# Patient Record
Sex: FEMALE | Race: WHITE | NOT HISPANIC OR LATINO | Employment: OTHER | URBAN - METROPOLITAN AREA
[De-identification: names, ages, dates, MRNs, and addresses within clinical notes are randomized per-mention and may not be internally consistent; named-entity substitution may affect disease eponyms.]

---

## 2017-01-31 ENCOUNTER — ALLSCRIPTS OFFICE VISIT (OUTPATIENT)
Dept: OTHER | Facility: OTHER | Age: 82
End: 2017-01-31

## 2017-05-05 ENCOUNTER — GENERIC CONVERSION - ENCOUNTER (OUTPATIENT)
Dept: OTHER | Facility: OTHER | Age: 82
End: 2017-05-05

## 2017-05-12 ENCOUNTER — ALLSCRIPTS OFFICE VISIT (OUTPATIENT)
Dept: OTHER | Facility: OTHER | Age: 82
End: 2017-05-12

## 2017-07-17 ENCOUNTER — GENERIC CONVERSION - ENCOUNTER (OUTPATIENT)
Dept: OTHER | Facility: OTHER | Age: 82
End: 2017-07-17

## 2017-07-28 ENCOUNTER — GENERIC CONVERSION - ENCOUNTER (OUTPATIENT)
Dept: OTHER | Facility: OTHER | Age: 82
End: 2017-07-28

## 2017-08-18 ENCOUNTER — GENERIC CONVERSION - ENCOUNTER (OUTPATIENT)
Dept: OTHER | Facility: OTHER | Age: 82
End: 2017-08-18

## 2017-08-22 ENCOUNTER — GENERIC CONVERSION - ENCOUNTER (OUTPATIENT)
Dept: OTHER | Facility: OTHER | Age: 82
End: 2017-08-22

## 2017-10-02 ENCOUNTER — GENERIC CONVERSION - ENCOUNTER (OUTPATIENT)
Dept: OTHER | Facility: OTHER | Age: 82
End: 2017-10-02

## 2017-11-06 ENCOUNTER — GENERIC CONVERSION - ENCOUNTER (OUTPATIENT)
Dept: OTHER | Facility: OTHER | Age: 82
End: 2017-11-06

## 2017-11-13 ENCOUNTER — GENERIC CONVERSION - ENCOUNTER (OUTPATIENT)
Dept: FAMILY MEDICINE CLINIC | Facility: CLINIC | Age: 82
End: 2017-11-13

## 2017-11-14 ENCOUNTER — GENERIC CONVERSION - ENCOUNTER (OUTPATIENT)
Dept: OTHER | Facility: OTHER | Age: 82
End: 2017-11-14

## 2018-01-03 ENCOUNTER — GENERIC CONVERSION - ENCOUNTER (OUTPATIENT)
Dept: FAMILY MEDICINE CLINIC | Facility: CLINIC | Age: 83
End: 2018-01-03

## 2018-01-12 VITALS
TEMPERATURE: 97.8 F | WEIGHT: 106.5 LBS | OXYGEN SATURATION: 97 % | HEART RATE: 139 BPM | HEIGHT: 62 IN | SYSTOLIC BLOOD PRESSURE: 122 MMHG | DIASTOLIC BLOOD PRESSURE: 70 MMHG | BODY MASS INDEX: 19.6 KG/M2 | RESPIRATION RATE: 16 BRPM

## 2018-01-13 VITALS
RESPIRATION RATE: 16 BRPM | HEIGHT: 62 IN | WEIGHT: 112.38 LBS | HEART RATE: 58 BPM | SYSTOLIC BLOOD PRESSURE: 136 MMHG | TEMPERATURE: 97.4 F | BODY MASS INDEX: 20.68 KG/M2 | DIASTOLIC BLOOD PRESSURE: 82 MMHG | OXYGEN SATURATION: 96 %

## 2018-01-24 NOTE — PROGRESS NOTES
Assessment   1  Encounter for preventive health examination (V70 0) (Z00 00)  2  Cancer of female breast (174 9) (C50 919)  3  Malignant neoplasm of colon (153 9) (C18 9)  4  Hypertension (401 9) (I10)  5  Atrial fibrillation (427 31) (I48 91)  6  Anxiety (300 00) (F41 9)  7  Pacemaker (V45 01) (Z95 0)  8  Tachycardia (785 0) (R00 0)  9  History of Cataract Surgery  10  Urinary incontinence (788 30) (R32)1      1 Amended By: Nadiya Leon; Jan 31 2017 12:25 PM EST    Plan  Anxiety    · Renew: ALPRAZolam 0 25 MG Oral Tablet; TAKE 1 TABLET 3 TIMES DAILY AS NEEDED  Need for pneumococcal vaccination    · Administered: Prevnar 13 Intramuscular Suspension  Tachycardia    · *VB - Urinary Incontinence Screen (Dx Z13 89 Screen for UI); Status:Complete;1    Done: 32BVK4385 12:00AM  Urinary incontinence    · Use an absorbent pad or garment to help with your urine accidents ; Status:Complete;    Done: 93IPU9030 12:26PM1      1 Amended By: Nadiya Leon; Jan 31 2017 12:24 PM EST    Discussion/Summary  Impression: Subsequent Annual Wellness Visit  Cardiovascular screening and counseling: the risks and benefits of screening were discussed and screening is current  Diabetes screening and counseling: the risks and benefits of screening were discussed and screening is current  Colorectal cancer screening and counseling: the risks and benefits of screening were discussed and the patient declines screening  Breast cancer screening and counseling: the risks and benefits of screening were discussed and the patient declines screening  Osteoporosis screening and counseling: the risks and benefits of screening were discussed and the patient declines screening  Glaucoma screening and counseling: the risks and benefits of screening were discussed and screening is current   Immunizations: the risks and benefits of influenza vaccination were discussed with the patient, influenza vaccine is up to date this year, the risks and benefits of pneumococcal vaccination were discussed with the patient and the lifetime pneumococcal vaccine has been completed  Advance Directive Planning: complete and up to date  Patient Discussion: plan discussed with the patient, plan discussed with the patient's family, follow-up visit needed in one year  Chief Complaint  patient presented here for annual well visit      History of Present Illness  Welcome to Medicare and Wellness Visits: The patient is being seen for the subsequent annual wellness visit  Medicare Screening and Risk Factors   Hospitalizations: she has been previously hospitalizied  Once per lifetime medicare screening tests: ECG  Medicare Screening Tests Risk Questions   Osteoporosis risk assessment: , female gender and over 48years of age  HIV risk assessment: +prbc's, but none indicated  Drug and Alcohol Use: The patient is a former cigarette smoker  The patient reports never drinking alcohol  She has never used illicit drugs  Diet and Physical Activity: Current diet includes well balanced meals  The patient does not exercise  Mood Disorder and Cognitive Impairment Screening: She denies feeling down, depressed, or hopeless over the past two weeks  She denies feeling little interest or pleasure in doing things over the past two weeks  Cognitive impairment screening: denies difficulty learning/retaining new information, denies difficulty handling complex tasks, denies difficulty with reasoning, denies difficulty with spatial ability and orientation, denies difficulty with language and denies difficulty with behavior  Functional Ability/Level of Safety: Hearing is slightly decreased and a hearing aid is not used   The patient is currently able to do activities of daily living without limitations, able to do instrumental activities of daily living without limitations, able to participate in social activities without limitations and able to drive without limitations  Activities of daily living details: transportation help needed, but does not need help using the phone, does not need help shopping, no meal preparation help needed, does not need help doing housework, does not need help doing laundry and does not need help managing money  Fall risk factors:  visual impairment and urinary incontinence, but no cognitive impairment and no previous fall  Home safety risk factors:  no grab bars in the bathroom and does not have grab bars in bathroom  Advance Directives: Advance directives: living will and dnr/dni with code  accept tx up, but no advance directives  end of life decisions were reviewed with the patient and I agree with the patient's decisions  Co-Managers and Medical Equipment/Suppliers: See Patient Care Team      Review of Systems    Constitutional: negative  ENT: negative  Cardiovascular: negative  Respiratory: negative  Active Problems   1  Anxiety (300 00) (F41 9)  2  Atrial fibrillation (427 31) (I48 91)  3  Breast cancer (174 9) (C50 919)  4  Cancer of female breast (174 9) (C50 919)  5  Hypertension (401 9) (I10)  6  Malignant neoplasm of colon (153 9) (C18 9)  7  Metastasis to liver (197 7) (C78 7)  8  Osteoporosis (733 00) (M81 0)  9  Pacemaker (V45 01) (Z95 0)  10  Tachycardia (785 0) (R00 0)    Past Medical History    · History of Arm swelling (729 81) (M79 89)   · History of Dysuria (788 1) (R30 0)   · History of H/O: hysterectomy (V88 01) (Z90 710)   · History of Hip pain (719 45) (M25 559)   · History of gastric ulcer (V12 79) (Z87 19)   · History of sick sinus syndrome (V12 59) (Z86 79)   · History of vertigo (V12 49) (Z87 898)   · History of Leg pain (729 5) (M79 606)   · History of Mass of left upper extremity (782 2) (R22 32)   · History of Palpitations (785 1) (R00 2)   · History of Sinus Tachycardia (427 89)    The active problems and past medical history were reviewed and updated today        Surgical History    · History of Breast Surgery Mastectomy   · History of Cataract Surgery   · History of Gallbladder Surgery   · History of Intra-arterial Infusion Pump   · History of Pacemaker Permanent Placement   · History of Radical Total Abdominal Hysterectomy   · History of Wedge Liver Biopsy    The surgical history was reviewed and updated today  Family History  Mother    · Family history of Breast Cancer (V16 3)   · Family history of Mother  At Age 80  Father    · Family history of Father  At Age [de-identified]   · Family history of Heart Disease (V17 49)   · Family history of Hypertension (V17 49)    The family history was reviewed and updated today  Social History    · Being A Social Drinker   · Former smoker (C24 63) (A03 039)  The social history was reviewed and updated today  The social history was reviewed and is unchanged  Current Meds  1  ALPRAZolam 0 25 MG Oral Tablet; TAKE 1 TABLET 3 TIMES DAILY AS NEEDED; Therapy: 59XUP4629 to (Joanna Richter)  Requested for: 51DDJ1777; Last   Rx:2016 Ordered  2  AmLODIPine Besylate 10 MG Oral Tablet; TAKE 1 TABLET DAILY; Therapy: 68HLY6511 to (Evaluate:2017)  Requested for: 47GLS7394; Last   Rx:25Fmd4396 Ordered  3  Anastrozole 1 MG Oral Tablet; TAKE 1 TABLET DAILY; Therapy: 76DRE0850 to Recorded  4  Calcium Citrate +D 315-250 MG-UNIT Oral Tablet; Therapy: 38Oyh1349 to Recorded  5  Centrum Oral Tablet; TAKE 1 TABLET DAILY; Therapy: 07RAK0712 to Recorded  6  D3 Super Strength 2000 UNIT Oral Capsule; daily; Therapy: 16SPL7024 to Recorded  7  Fish Oil Ultra 1000 MG CAPS; 2gm daily; Therapy: 93UET7269 to Recorded  8  Metoprolol Tartrate 50 MG Oral Tablet; TAKE 1 TABLET TWICE DAILY; Therapy: 25LVE2832 to (Evaluate:50Gvq2171)  Requested for: 67Ttm0499; Last   Rx:87Uzv1912 Ordered  9  Mupirocin 2 % External Ointment; APPLY AND GENTLY MASSAGE INTO AFFECTED   AREA(S) 3 TIMES DAILY;    Therapy: 79KDM9214 to (Alicia Maravilla)  Requested for: 40QPI6379; Last   Rx:22Jun2016 Ordered  10  Triamterene-HCTZ 37 5-25 MG Oral Capsule; TAKE 1 CAPSULE DAILY; Therapy: 43ZTI3963 to (Evaluate:16Xpx6327)  Requested for: 62FJW7409; Last    Rx:22Jun2016 Ordered  11  Vitamin D3 Super Strength 2000 UNIT Oral Tablet; Therapy: 82SIJ7962 to Recorded  12  Xarelto 15 MG Oral Tablet; Therapy: 00KER8029 to Recorded    The medication list was reviewed and updated today  Allergies   1  No Known Drug Allergies    Immunizations   1    Influenza  After 01Oct2015    PPSV  01-Jan-2010     Vitals  Signs    Temperature: 97 4 F, Tympanic  Heart Rate: 58  Pulse Quality: Normal  Respiration Quality: Normal  Respiration: 16  Systolic: 072, LUE, Sitting  Diastolic: 82, LUE, Sitting  Height: 5 ft 1 75 in  Weight: 112 lb 6 oz  BMI Calculated: 20 72  BSA Calculated: 1 49  O2 Saturation: 96    Physical Exam    Constitutional   General appearance: No acute distress, well appearing and well nourished  Head and Face   Head and face: Normal     Palpation of the face and sinuses: No sinus tenderness  Eyes   Conjunctiva and lids: No swelling, erythema or discharge  Pupils and irises: Equal, round, reactive to light  Ophthalmoscopic examination: Normal fundi and optic discs  Ears, Nose, Mouth, and Throat   External inspection of ears and nose: Normal     Otoscopic examination: Tympanic membranes translucent with normal light reflex  Canals patent without erythema  Hearing: Normal     Nasal mucosa, septum, and turbinates: Normal without edema or erythema  Lips, teeth, and gums: Normal, good dentition  Oropharynx: Normal with no erythema, edema, exudate or lesions  Neck   Neck: Supple, symmetric, trachea midline, no masses  Thyroid: Normal, no thyromegaly  Pulmonary   Respiratory effort: No increased work of breathing or signs of respiratory distress  Auscultation of lungs: Clear to auscultation      Cardiovascular   Auscultation of heart: Normal rate and rhythm, normal S1 and S2, no murmurs  Carotid pulses: 2+ bilaterally  Results/Data  PHQ-2 Adult Depression Screening 31Jan2017 07:49AM User, Ahs     Test Name Result Flag Reference   PHQ-2 Adult Depression Score 0     Over the last two weeks, how often have you been bothered by any of the following problems? Little interest or pleasure in doing things: Not at all - 0  Feeling down, depressed, or hopeless: Not at all - 0   PHQ-2 Adult Depression Screening Negative       Falls Risk Assessment (Dx Z13 89 Screen for Neurologic Disorder) 41RMW2947 07:49AM User, Ahs     Test Name Result Flag Reference   Falls Risk      No falls in the past year     *VB - Urinary Incontinence Screen (Dx Z13 89 Screen for UI) 54BUY1303 12:00AM Katie Garcia   wears depends       Test Name Result Flag Reference   Urinary Incontinence Assessment [id number]         Future Appointments    Date/Time Provider Specialty Site   02/09/2018 07:45 AM Katie Garcia DO Internal Medicine [address] [facility]   Electronically signed by : Kathe Henriquez DO; Jan 31 2017 12:26PM EST                       (Author)

## 2018-02-09 ENCOUNTER — OFFICE VISIT (OUTPATIENT)
Dept: FAMILY MEDICINE CLINIC | Facility: CLINIC | Age: 83
End: 2018-02-09
Payer: MEDICARE

## 2018-02-09 VITALS
DIASTOLIC BLOOD PRESSURE: 70 MMHG | BODY MASS INDEX: 22.34 KG/M2 | TEMPERATURE: 97.5 F | SYSTOLIC BLOOD PRESSURE: 118 MMHG | HEIGHT: 62 IN | RESPIRATION RATE: 16 BRPM | HEART RATE: 90 BPM | OXYGEN SATURATION: 95 % | WEIGHT: 121.4 LBS

## 2018-02-09 DIAGNOSIS — Z00.00 MEDICARE ANNUAL WELLNESS VISIT, SUBSEQUENT: Primary | ICD-10-CM

## 2018-02-09 PROBLEM — E55.9 VITAMIN D DEFICIENCY: Status: ACTIVE | Noted: 2017-09-16

## 2018-02-09 PROBLEM — H54.8 LEGALLY BLIND: Status: ACTIVE | Noted: 2018-02-09

## 2018-02-09 PROBLEM — C78.6 OMENTAL METASTASIS (HCC): Status: ACTIVE | Noted: 2017-02-10

## 2018-02-09 PROBLEM — H35.30 MACULAR DEGENERATION: Status: ACTIVE | Noted: 2018-02-09

## 2018-02-09 PROCEDURE — G0439 PPPS, SUBSEQ VISIT: HCPCS | Performed by: INTERNAL MEDICINE

## 2018-02-09 RX ORDER — METOPROLOL TARTRATE 50 MG/1
1 TABLET, FILM COATED ORAL 2 TIMES DAILY
COMMUNITY
Start: 2013-03-19 | End: 2018-07-17 | Stop reason: SDUPTHER

## 2018-02-09 RX ORDER — TRIAMTERENE AND HYDROCHLOROTHIAZIDE 37.5; 25 MG/1; MG/1
1 TABLET ORAL DAILY
COMMUNITY
Start: 2018-01-05 | End: 2018-04-06 | Stop reason: SDUPTHER

## 2018-02-09 RX ORDER — ALPRAZOLAM 0.25 MG/1
1 TABLET ORAL 3 TIMES DAILY PRN
COMMUNITY
Start: 2011-06-28 | End: 2018-06-04 | Stop reason: SDUPTHER

## 2018-02-09 RX ORDER — ANASTROZOLE 1 MG/1
TABLET ORAL DAILY
COMMUNITY
Start: 2013-09-24

## 2018-02-09 RX ORDER — PANTOPRAZOLE SODIUM 40 MG/1
40 TABLET, DELAYED RELEASE ORAL
COMMUNITY
Start: 2017-11-06 | End: 2018-08-15 | Stop reason: SDUPTHER

## 2018-02-09 RX ORDER — AMLODIPINE BESYLATE 10 MG/1
1 TABLET ORAL DAILY
COMMUNITY
Start: 2013-03-19 | End: 2018-07-20 | Stop reason: SDUPTHER

## 2018-02-09 NOTE — PROGRESS NOTES
Assessment/Plan:    Provider Screening     Preventative Screening/Counseling:   Cardiovascular Screening/Counseling:   (Labs Q5 years, EKG optional one-time)   General:  Risks and Benefits Discussed, Screening Current           Diabetes Screening/Counseling:   (2 tests/year if Pre-Diabetes or 1 test/year if no Diabetes)   General:  Risks and Benefits Discussed, Screening Current           Colorectal Cancer Screening/Counseling:   (FOBT Q1 yr; Flex Sig Q4 yrs or Q10 yrs after Screening Colonoscopy; Screening Colonoscpy Q2 yrs High Risk or Q10 yrs Low Risk; Barium Enema Q2 yrs High Risk or Q4 yrs Low Risk)   General:  Risks and Benefits Discussed, Patient Declines           Prostate Cancer Screening/Counseling:   (Annual)          Breast Cancer Screening/Counseling:   (Baseline Age 28 - 43; Annual Age 36+)   General:  Risks and Benefits Discussed, Patient Declines          Cervical Cancer Screening/Counseling:   (Annual for High Risk or Childbearing Age with Abnormal Pap in Last 3 yrs; Every 2 all others)   General:  Risks and Benefits Discussed, Patient Declines           Osteoporosis Screening/Counseling:   (Every 2 Yrs if at risk or more if medically necessary)   General:  Risks and Benefits Discussed, Patient Declines           AAA Screening/Counseling:   (Once per Lifetime with risk factors)          Glaucoma Screening/Counseling:   (Annual)   General:  Risks and Benefits Discussed, Screening Current          HIV Screening/Counseling:   (Voluntary; Once annually for high risk OR 3 times for Pregnancy at diagnosis of IUP; 3rd trimester; and at Labor         Hepatitis C Screening:   Hepatitis C Counseling Provided:   Yes               Immunizations:   Influenza (annual):  Risks & Benefits Discussed   Pneumococcal (Once in a Lifetime):  Risks & Benefits Discussed, Lifetime Vaccine Completed       Other Preventative Couseling (Non-Medicare Wellness Visit Required):       Referrals (Non-Medicare Wellness Visit Required):   cardiologist consult, Hematology/Oncology       Medical Equipment/Suppliers:              Diagnoses and all orders for this visit:    Medicare annual wellness visit, subsequent    Other orders  -     ALPRAZolam (XANAX) 0 25 mg tablet; Take 1 tablet by mouth 3 (three) times a day as needed  -     amLODIPine (NORVASC) 10 mg tablet; Take 1 tablet by mouth daily  -     anastrozole (ARIMIDEX) 1 mg tablet; Take by mouth Daily  -     Multiple Vitamins-Minerals (CENTRUM ADULTS PO); Take 1 tablet by mouth daily  -     Cholecalciferol (D3 SUPER STRENGTH) 2000 units CAPS; Take by mouth  -     Omega-3 Fatty Acids (FISH OIL ULTRA) 1000 MG CAPS; Take by mouth  -     metoprolol tartrate (LOPRESSOR) 50 mg tablet; Take 1 tablet by mouth 2 (two) times a day  -     pantoprazole (PROTONIX) 40 mg tablet; Take 40 mg by mouth  -     triamterene-hydrochlorothiazide (MAXZIDE-25) 37 5-25 mg per tablet; Take 1 tablet by mouth daily  -     rivaroxaban (XARELTO) 15 mg tablet;  Take by mouth          Vitals:    02/09/18 0801   BP: 118/70   Pulse: 90   Resp: 16   Temp: 97 5 °F (36 4 °C)   SpO2: 95%     AWV Clinical     ISAR:       Once in a Lifetime Medicare Screening:       Medicare Screening Tests and Risk Assessment:   AAA Risk Assessment    Osteoporosis Risk Assessment    HIV Risk Assessment        Drug and Alcohol Use:   Tobacco use    Cigarettes:  former smoker    Smokeless:  never used smokeless tobacco    Tobacco use duration    Tobacco Cessation Readiness    Alcohol use    Alcohol use:  never    Alcohol Treatment Readiness   Illicit Drug Use    Drug use:  never        Diet & Exercise:   Diet   What is your diet?:  Regular   How many servings a day of the following:   Fruits and Vegetables:  1-2 Meat:  1-2   Dairy:  2 Soda:  0   Coffee:  2 Tea:  1   Exercise    Do you currently exercise?:  currently not exercising       Cognitive Impairment Screening:   Depression screening preformed:  Yes    Cognitive Impairment Screening Do you have difficulty learning or retaining new information?:  No Do you have difficulty handling new tasks?:  No   Do you have difficulty with reasoning?:  No Do you have difficulty with spatial ability and orientation?:  No   Do you have difficulty with language?:  No Do you have difficulty with behavior?:  No       Functional Ability/Level of Safety:   Hearing    Hearing difficulties:  No    Hearing aid:  No    Hearing Impairment Assessment    Hearing status:  No impairment   Current Activities    Help needed with the folllowing:    ADL    Fall Risk   Injury History       Home Safety:   Are there hazards in your environment?:  No   If you fell, would you need help to get back up from the ground?:  No Do you have problems or concerns getting in/out of a bed, chair, tub, or toilet?:  No   Do you feel unsteady when walking?:  No Is your activity limited by pain?:  No   Do you have handrails and grab-bars in the home?:  Yes Are emergency numbers kept by the phone and regularly updated?:  Yes   Are you and/or family members aware of the dangers of smoking in bed?:  Yes    Do you have working smoke alarms and fire extinguisher?:  Yes Do all household members know how to use them?:  Yes   Have you left the stove on unsupervised?:  No    Home Safety Risk Factors   Unfamilar with surroundings:  No Uneven floors:  No   Stairs or handrail saftey risk:  No Loose rugs:  No   Household clutter:  No Poor household lighting:  No   No grab bars in bathroom:  No Further evaluation needed:  No       Advanced Directives:   Advanced Directives    Living Will:  Yes    Advanced directive:  Yes    Patient's End of Life Decisions        Urinary Incontinence:   Do you have urinary incontinence?:  Yes    Do you urinate frequently?:  No    Do you have urinary hesitancy?:  No Do you have dysuria (painful and/or difficult urination)?:  No   Do you have nocturia (waking up to urinate)?:  No    Do you have a weak stream when urinating?:  No Glaucoma:           AWV Clinical     ISAR:       Once in a Lifetime Medicare Screening:       Medicare Screening Tests and Risk Assessment:   AAA Risk Assessment    Osteoporosis Risk Assessment    HIV Risk Assessment        Drug and Alcohol Use:   Tobacco use    Cigarettes:  former smoker    Smokeless:  never used smokeless tobacco    Tobacco use duration    Tobacco Cessation Readiness    Alcohol use    Alcohol use:  never    Alcohol Treatment Readiness   Illicit Drug Use    Drug use:  never        Diet & Exercise:   Diet   What is your diet?:  Regular   How many servings a day of the following:   Fruits and Vegetables:  1-2 Meat:  1-2   Dairy:  2 Soda:  0   Coffee:  2 Tea:  1   Exercise    Do you currently exercise?:  currently not exercising       Cognitive Impairment Screening:   Depression screening preformed:  Yes    Cognitive Impairment Screening    Do you have difficulty learning or retaining new information?:  No Do you have difficulty handling new tasks?:  No   Do you have difficulty with reasoning?:  No Do you have difficulty with spatial ability and orientation?:  No   Do you have difficulty with language?:  No Do you have difficulty with behavior?:  No       Functional Ability/Level of Safety:   Hearing    Hearing difficulties:  No    Hearing aid:  No    Hearing Impairment Assessment    Hearing status:  No impairment   Current Activities    Help needed with the folllowing:    ADL    Fall Risk   Injury History       Home Safety:   Are there hazards in your environment?:  No   If you fell, would you need help to get back up from the ground?:  No Do you have problems or concerns getting in/out of a bed, chair, tub, or toilet?:  No   Do you feel unsteady when walking?:  No Is your activity limited by pain?:  No   Do you have handrails and grab-bars in the home?:  Yes Are emergency numbers kept by the phone and regularly updated?:  Yes   Are you and/or family members aware of the dangers of smoking in bed?:  Yes    Do you have working smoke alarms and fire extinguisher?:  Yes Do all household members know how to use them?:  Yes   Have you left the stove on unsupervised?:  No    Home Safety Risk Factors   Unfamilar with surroundings:  No Uneven floors:  No   Stairs or handrail saftey risk:  No Loose rugs:  No   Household clutter:  No Poor household lighting:  No   No grab bars in bathroom:  No Further evaluation needed:  No       Advanced Directives:   Advanced Directives    Living Will:  Yes    Advanced directive:  Yes    Patient's End of Life Decisions        Urinary Incontinence:   Do you have urinary incontinence?:  Yes    Do you urinate frequently?:  No    Do you have urinary hesitancy?:  No Do you have dysuria (painful and/or difficult urination)?:  No   Do you have nocturia (waking up to urinate)?:  No    Do you have a weak stream when urinating?:  No        Glaucoma:               Subjective:      Patient ID: Deb Martinez is a 80 y o  female  Pt in for medicare well  Filled out form for chr ill individual for her taxes  The following portions of the patient's history were reviewed and updated as appropriate: She  has a past medical history of Arm swelling; Depression; Dysuria; Gastric ulcer; H/O: hysterectomy; Hip pain; Leg pain; Mass of left upper extremity; Palpitations; Sick sinus syndrome ([facility] [address] ) (last assessed - [id number]); Sinus tachycardia; and Vertigo  She  does not have any pertinent problems on file  She  has a past surgical history that includes Mastectomy; Cataract extraction; Gallbladder surgery; Infusion pump implantation; Cardiac pacemaker placement; Radical abdominal hysterectomy; and Wedge liver biopsy  Her family history includes Breast cancer in her mother; Depression in her mother; Heart disease in her father; Hypertension in her father  She  reports that she quit smoking about 4 years ago  She has a 56 00 pack-year smoking history   She has never used smokeless tobacco  She reports that she drinks alcohol  Her drug history is not on file  Current Outpatient Prescriptions   Medication Sig Dispense Refill    ALPRAZolam (XANAX) 0 25 mg tablet Take 1 tablet by mouth 3 (three) times a day as needed      amLODIPine (NORVASC) 10 mg tablet Take 1 tablet by mouth daily      anastrozole (ARIMIDEX) 1 mg tablet Take by mouth Daily      Cholecalciferol (D3 SUPER STRENGTH) 2000 units CAPS Take by mouth      metoprolol tartrate (LOPRESSOR) 50 mg tablet Take 1 tablet by mouth 2 (two) times a day      Multiple Vitamins-Minerals (CENTRUM ADULTS PO) Take 1 tablet by mouth daily      Omega-3 Fatty Acids (FISH OIL ULTRA) 1000 MG CAPS Take by mouth      pantoprazole (PROTONIX) 40 mg tablet Take 40 mg by mouth      rivaroxaban (XARELTO) 15 mg tablet Take by mouth      triamterene-hydrochlorothiazide (MAXZIDE-25) 37 5-25 mg per tablet Take 1 tablet by mouth daily       No current facility-administered medications for this visit  No current outpatient prescriptions on file prior to visit  No current facility-administered medications on file prior to visit  She has No Known Allergies       Review of Systems   Constitutional: Negative  HENT: Negative  Eyes: Positive for visual disturbance  Respiratory: Negative  Cardiovascular: Negative  Objective:     Physical Exam   Constitutional: She appears well-developed and well-nourished  HENT:   Head: Normocephalic and atraumatic  Neck: Normal range of motion  Neck supple  Cardiovascular: Normal rate, regular rhythm and normal heart sounds  Pulmonary/Chest: Effort normal and breath sounds normal    Abdominal: Soft   Bowel sounds are normal

## 2018-02-28 ENCOUNTER — OFFICE VISIT (OUTPATIENT)
Dept: FAMILY MEDICINE CLINIC | Facility: CLINIC | Age: 83
End: 2018-02-28
Payer: MEDICARE

## 2018-02-28 ENCOUNTER — APPOINTMENT (OUTPATIENT)
Dept: RADIOLOGY | Facility: MEDICAL CENTER | Age: 83
End: 2018-02-28
Payer: MEDICARE

## 2018-02-28 VITALS
HEIGHT: 62 IN | BODY MASS INDEX: 21.9 KG/M2 | HEART RATE: 83 BPM | RESPIRATION RATE: 16 BRPM | SYSTOLIC BLOOD PRESSURE: 128 MMHG | DIASTOLIC BLOOD PRESSURE: 78 MMHG | WEIGHT: 119 LBS | OXYGEN SATURATION: 95 % | TEMPERATURE: 98.3 F

## 2018-02-28 DIAGNOSIS — S40.011A CONTUSION OF RIGHT SHOULDER, INITIAL ENCOUNTER: Primary | ICD-10-CM

## 2018-02-28 DIAGNOSIS — S40.011A CONTUSION OF RIGHT SHOULDER, INITIAL ENCOUNTER: ICD-10-CM

## 2018-02-28 PROCEDURE — 73030 X-RAY EXAM OF SHOULDER: CPT

## 2018-02-28 PROCEDURE — 99213 OFFICE O/P EST LOW 20 MIN: CPT | Performed by: INTERNAL MEDICINE

## 2018-02-28 RX ORDER — TRAMADOL HYDROCHLORIDE 50 MG/1
50 TABLET ORAL EVERY 6 HOURS PRN
Qty: 30 TABLET | Refills: 1 | Status: SHIPPED | OUTPATIENT
Start: 2018-02-28 | End: 2019-07-22

## 2018-02-28 NOTE — PROGRESS NOTES
Assessment/Plan:         Diagnoses and all orders for this visit:    Contusion of right shoulder, initial encounter  Comments:  add tramadol to her tylenol  she will ice her rt shoulder and to ortho if not resolved  Orders:  -     traMADol (ULTRAM) 50 mg tablet; Take 1 tablet (50 mg total) by mouth every 6 (six) hours as needed for moderate pain  -     Cancel: XR shoulder 1 vw right; Future          Subjective:      Patient ID: Ronda Browne is a 80 y o  female  Pt complains of rt shoulder pain  +pain on and off x 2 weeks  +heart burn  Pt relates both arms bother her when she abducts  The following portions of the patient's history were reviewed and updated as appropriate: She  has a past medical history of Arm swelling; Depression; Dysuria; Gastric ulcer; H/O: hysterectomy; Hip pain; Leg pain; Mass of left upper extremity; Palpitations; Sick sinus syndrome ([facility] ) (last assessed - [id number]); Sinus tachycardia; and Vertigo  She   Patient Active Problem List    Diagnosis Date Noted    Macular degeneration 02/09/2018    Legally blind 02/09/2018    Vitamin D deficiency 09/16/2017    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    Hepatic metastasis ([facility] ) 11/03/2016    Anticoagulant long-term use 12/30/2015    Cardiac pacemaker in situ 11/16/2015    Breast cancer ([facility] ) 01/13/2015    Atrial fibrillation ([facility] ) 07/22/2014    Osteoporosis 05/20/2014    Chronic kidney disease, stage III (moderate) 05/15/2013    Essential hypertension 05/15/2013    Anxiety 03/19/2013    Malignant neoplasm of colon ([facility] ) 08/22/2012     She  has a past surgical history that includes Mastectomy; Cataract extraction; Gallbladder surgery; Infusion pump implantation; Cardiac pacemaker placement; Radical abdominal hysterectomy; and Wedge liver biopsy  Her family history includes Breast cancer in her mother; Depression in her mother; Heart disease in her father; Hypertension in her father    She  reports that she quit smoking about 4 years ago  She has a 56 00 pack-year smoking history  She has never used smokeless tobacco  She reports that she drinks alcohol  Her drug history is not on file  Current Outpatient Prescriptions   Medication Sig Dispense Refill    ALPRAZolam (XANAX) 0 25 mg tablet Take 1 tablet by mouth 3 (three) times a day as needed      amLODIPine (NORVASC) 10 mg tablet Take 1 tablet by mouth daily      anastrozole (ARIMIDEX) 1 mg tablet Take by mouth Daily      Cholecalciferol (D3 SUPER STRENGTH) 2000 units CAPS Take by mouth      metoprolol tartrate (LOPRESSOR) 50 mg tablet Take 1 tablet by mouth 2 (two) times a day      Multiple Vitamins-Minerals (CENTRUM ADULTS PO) Take 1 tablet by mouth daily      rivaroxaban (XARELTO) 15 mg tablet Take by mouth      triamterene-hydrochlorothiazide (MAXZIDE-25) 37 5-25 mg per tablet Take 1 tablet by mouth daily      Omega-3 Fatty Acids (FISH OIL ULTRA) 1000 MG CAPS Take by mouth      pantoprazole (PROTONIX) 40 mg tablet Take 40 mg by mouth      traMADol (ULTRAM) 50 mg tablet Take 1 tablet (50 mg total) by mouth every 6 (six) hours as needed for moderate pain 30 tablet 1     No current facility-administered medications for this visit        Current Outpatient Prescriptions on File Prior to Visit   Medication Sig    ALPRAZolam (XANAX) 0 25 mg tablet Take 1 tablet by mouth 3 (three) times a day as needed    amLODIPine (NORVASC) 10 mg tablet Take 1 tablet by mouth daily    anastrozole (ARIMIDEX) 1 mg tablet Take by mouth Daily    Cholecalciferol (D3 SUPER STRENGTH) 2000 units CAPS Take by mouth    metoprolol tartrate (LOPRESSOR) 50 mg tablet Take 1 tablet by mouth 2 (two) times a day    Multiple Vitamins-Minerals (CENTRUM ADULTS PO) Take 1 tablet by mouth daily    rivaroxaban (XARELTO) 15 mg tablet Take by mouth    triamterene-hydrochlorothiazide (MAXZIDE-25) 37 5-25 mg per tablet Take 1 tablet by mouth daily    Omega-3 Fatty Acids (FISH OIL ULTRA) 1000 MG CAPS Take by mouth    pantoprazole (PROTONIX) 40 mg tablet Take 40 mg by mouth     No current facility-administered medications on file prior to visit  She has No Known Allergies       Review of Systems   Constitutional: Negative  HENT: Negative  Respiratory: Negative  Cardiovascular: Negative  Objective:      /78 (BP Location: Left arm, Patient Position: Sitting, Cuff Size: Standard)   Pulse 83   Temp 98 3 °F (36 8 °C) (Tympanic)   Resp 16   Ht 5' 1 75" (1 568 m)   Wt 54 kg (119 lb)   SpO2 95%   BMI 21 94 kg/m²          Physical Exam   Constitutional: She appears well-developed and well-nourished  HENT:   Head: Normocephalic and atraumatic  Neck: Normal range of motion  Neck supple  Cardiovascular: Normal rate and regular rhythm  Pulmonary/Chest: Effort normal and breath sounds normal    Musculoskeletal: She exhibits edema  Resolving bruise

## 2018-04-06 DIAGNOSIS — I10 HYPERTENSION, UNSPECIFIED TYPE: Primary | ICD-10-CM

## 2018-04-06 RX ORDER — TRIAMTERENE AND HYDROCHLOROTHIAZIDE 37.5; 25 MG/1; MG/1
TABLET ORAL
Qty: 90 TABLET | Refills: 0 | Status: SHIPPED | OUTPATIENT
Start: 2018-04-06 | End: 2018-07-08 | Stop reason: SDUPTHER

## 2018-06-04 DIAGNOSIS — F41.9 ANXIETY: Primary | ICD-10-CM

## 2018-06-05 RX ORDER — ALPRAZOLAM 0.25 MG/1
0.25 TABLET ORAL 3 TIMES DAILY PRN
Qty: 100 TABLET | Refills: 0 | Status: SHIPPED | OUTPATIENT
Start: 2018-06-05 | End: 2019-03-08 | Stop reason: SDUPTHER

## 2018-07-08 DIAGNOSIS — I10 HYPERTENSION, UNSPECIFIED TYPE: ICD-10-CM

## 2018-07-09 RX ORDER — TRIAMTERENE AND HYDROCHLOROTHIAZIDE 37.5; 25 MG/1; MG/1
1 TABLET ORAL DAILY
Qty: 90 TABLET | Refills: 1 | Status: SHIPPED | OUTPATIENT
Start: 2018-07-09 | End: 2019-11-12 | Stop reason: SDUPTHER

## 2018-07-13 DIAGNOSIS — I10 HYPERTENSION, UNSPECIFIED TYPE: ICD-10-CM

## 2018-07-14 RX ORDER — TRIAMTERENE AND HYDROCHLOROTHIAZIDE 37.5; 25 MG/1; MG/1
1 TABLET ORAL DAILY
Qty: 90 TABLET | Refills: 0 | Status: SHIPPED | OUTPATIENT
Start: 2018-07-14 | End: 2019-03-08

## 2018-07-17 DIAGNOSIS — I10 ESSENTIAL HYPERTENSION: Primary | ICD-10-CM

## 2018-07-17 RX ORDER — METOPROLOL TARTRATE 50 MG/1
50 TABLET, FILM COATED ORAL 2 TIMES DAILY
Qty: 180 TABLET | Refills: 1 | Status: SHIPPED | OUTPATIENT
Start: 2018-07-17 | End: 2019-01-22 | Stop reason: SDUPTHER

## 2018-07-20 DIAGNOSIS — I10 ESSENTIAL HYPERTENSION: Primary | ICD-10-CM

## 2018-07-20 RX ORDER — AMLODIPINE BESYLATE 10 MG/1
10 TABLET ORAL DAILY
Qty: 90 TABLET | Refills: 1 | Status: SHIPPED | OUTPATIENT
Start: 2018-07-20 | End: 2019-01-22 | Stop reason: SDUPTHER

## 2018-08-02 ENCOUNTER — OFFICE VISIT (OUTPATIENT)
Dept: FAMILY MEDICINE CLINIC | Facility: CLINIC | Age: 83
End: 2018-08-02
Payer: MEDICARE

## 2018-08-02 VITALS
HEIGHT: 62 IN | DIASTOLIC BLOOD PRESSURE: 76 MMHG | OXYGEN SATURATION: 97 % | BODY MASS INDEX: 23.74 KG/M2 | HEART RATE: 79 BPM | WEIGHT: 129 LBS | SYSTOLIC BLOOD PRESSURE: 138 MMHG | TEMPERATURE: 97.2 F | RESPIRATION RATE: 16 BRPM

## 2018-08-02 DIAGNOSIS — M81.0 OSTEOPOROSIS, UNSPECIFIED OSTEOPOROSIS TYPE, UNSPECIFIED PATHOLOGICAL FRACTURE PRESENCE: ICD-10-CM

## 2018-08-02 DIAGNOSIS — I10 ESSENTIAL HYPERTENSION: Primary | ICD-10-CM

## 2018-08-02 DIAGNOSIS — C78.7 HEPATIC METASTASIS (HCC): ICD-10-CM

## 2018-08-02 DIAGNOSIS — C50.919 MALIGNANT NEOPLASM OF FEMALE BREAST, UNSPECIFIED ESTROGEN RECEPTOR STATUS, UNSPECIFIED LATERALITY, UNSPECIFIED SITE OF BREAST (HCC): ICD-10-CM

## 2018-08-02 DIAGNOSIS — Z95.0 CARDIAC PACEMAKER IN SITU: ICD-10-CM

## 2018-08-02 DIAGNOSIS — C18.9 MALIGNANT NEOPLASM OF COLON, UNSPECIFIED PART OF COLON (HCC): ICD-10-CM

## 2018-08-02 DIAGNOSIS — F41.9 ANXIETY: ICD-10-CM

## 2018-08-02 DIAGNOSIS — I48.91 ATRIAL FIBRILLATION, UNSPECIFIED TYPE (HCC): ICD-10-CM

## 2018-08-02 DIAGNOSIS — N18.30 CHRONIC KIDNEY DISEASE, STAGE III (MODERATE) (HCC): ICD-10-CM

## 2018-08-02 PROCEDURE — 99214 OFFICE O/P EST MOD 30 MIN: CPT | Performed by: INTERNAL MEDICINE

## 2018-08-02 RX ORDER — ACETAMINOPHEN 325 MG/1
650 TABLET ORAL EVERY 4 HOURS PRN
COMMUNITY

## 2018-08-02 NOTE — PROGRESS NOTES
Assessment/Plan:    No problem-specific Assessment & Plan notes found for this encounter  There are no diagnoses linked to this encounter  Subjective:      Patient ID: Sharri Wall is a 80 y o  female  HPI    The following portions of the patient's history were reviewed and updated as appropriate:   She  has a past medical history of Arm swelling; Depression; Dysuria; Gastric ulcer; H/O: hysterectomy; Hip pain; Leg pain; Mass of left upper extremity; Palpitations; Sick sinus syndrome ([facility] [address] ) (last assessed - [id number]); Sinus tachycardia; and Vertigo  She   Patient Active Problem List    Diagnosis Date Noted    Macular degeneration 02/09/2018    Legally blind 02/09/2018    Vitamin D deficiency 09/16/2017    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    [facility] 11/03/2016    Anticoagulant long-term use 12/30/2015    Cardiac pacemaker in situ 11/16/2015    Breast cancer ([facility] ) 01/13/2015    [facility] 07/22/2014    Osteoporosis 05/20/2014    Chronic kidney disease, stage III (moderate) 05/15/2013    Essential hypertension 05/15/2013    Anxiety 03/19/2013    Malignant neoplasm of colon ([facility] [address] ) 08/22/2012     She  has a past surgical history that includes Mastectomy; Cataract extraction; Gallbladder surgery; Infusion pump implantation; Cardiac pacemaker placement; Radical abdominal hysterectomy; and Wedge liver biopsy  Her family history includes Breast cancer in her mother; Depression in her mother; Heart disease in her father; Hypertension in her father  She  reports that she quit smoking about 4 years ago  She has a 56 00 pack-year smoking history  She has never used smokeless tobacco  She reports that she does not drink alcohol or use drugs    Current Outpatient Prescriptions   Medication Sig Dispense Refill    acetaminophen (TYLENOL) 325 mg tablet Take 650 mg by mouth every 4 (four) hours as needed for mild pain      ALPRAZolam (XANAX) 0 25 mg tablet Take 1 tablet (0 25 mg total) by mouth 3 (three) times a day as needed for anxiety 100 tablet 0    amLODIPine (NORVASC) 10 mg tablet Take 1 tablet (10 mg total) by mouth daily 90 tablet 1    anastrozole (ARIMIDEX) 1 mg tablet Take by mouth Daily      Cholecalciferol (D3 SUPER STRENGTH) 2000 units CAPS Take by mouth      metoprolol tartrate (LOPRESSOR) 50 mg tablet Take 1 tablet (50 mg total) by mouth 2 (two) times a day 180 tablet 1    Multiple Vitamins-Minerals (CENTRUM ADULTS PO) Take 1 tablet by mouth daily      pantoprazole (PROTONIX) 40 mg tablet Take 40 mg by mouth      rivaroxaban (XARELTO) 15 mg tablet Take by mouth      triamterene-hydrochlorothiazide (MAXZIDE-25) 37 5-25 mg per tablet Take 1 tablet by mouth daily 90 tablet 0    traMADol (ULTRAM) 50 mg tablet Take 1 tablet (50 mg total) by mouth every 6 (six) hours as needed for moderate pain 30 tablet 1    triamterene-hydrochlorothiazide (MAXZIDE-25) 37 5-25 mg per tablet Take 1 tablet by mouth daily 90 tablet 1     No current facility-administered medications for this visit        Current Outpatient Prescriptions on File Prior to Visit   Medication Sig    ALPRAZolam (XANAX) 0 25 mg tablet Take 1 tablet (0 25 mg total) by mouth 3 (three) times a day as needed for anxiety    amLODIPine (NORVASC) 10 mg tablet Take 1 tablet (10 mg total) by mouth daily    anastrozole (ARIMIDEX) 1 mg tablet Take by mouth Daily    Cholecalciferol (D3 SUPER STRENGTH) 2000 units CAPS Take by mouth    metoprolol tartrate (LOPRESSOR) 50 mg tablet Take 1 tablet (50 mg total) by mouth 2 (two) times a day    Multiple Vitamins-Minerals (CENTRUM ADULTS PO) Take 1 tablet by mouth daily    pantoprazole (PROTONIX) 40 mg tablet Take 40 mg by mouth    rivaroxaban (XARELTO) 15 mg tablet Take by mouth    triamterene-hydrochlorothiazide (MAXZIDE-25) 37 5-25 mg per tablet Take 1 tablet by mouth daily    traMADol (ULTRAM) 50 mg tablet Take 1 tablet (50 mg total) by mouth every 6 (six) hours as needed for moderate pain    triamterene-hydrochlorothiazide (MAXZIDE-25) 37 5-25 mg per tablet Take 1 tablet by mouth daily    [DISCONTINUED] Omega-3 Fatty Acids (FISH OIL ULTRA) 1000 MG CAPS Take by mouth     No current facility-administered medications on file prior to visit  She has No Known Allergies       Review of Systems   Constitutional: Negative  HENT: Negative  Respiratory: Negative  Cardiovascular: Negative  Gastrointestinal: Negative  Objective:      /76 (BP Location: Left arm, Patient Position: Sitting, Cuff Size: Standard)   Pulse 79   Temp (!) 97 2 °F (36 2 °C) (Tympanic)   Resp 16   Ht 5' 1 75" (1 568 m)   Wt 58 5 kg (129 lb)   SpO2 97%   BMI 23 79 kg/m²          Physical Exam   Constitutional: She appears well-developed and well-nourished  HENT:   Head: Normocephalic and atraumatic  Right Ear: External ear normal    Left Ear: External ear normal    Nose: Nose normal    Mouth/Throat: Oropharynx is clear and moist    Neck: Normal range of motion  Neck supple  Cardiovascular: Normal rate and regular rhythm  Pulmonary/Chest: Effort normal and breath sounds normal    Abdominal: Soft   Bowel sounds are normal

## 2018-08-15 DIAGNOSIS — R10.13 DYSPEPSIA: Primary | ICD-10-CM

## 2018-08-15 RX ORDER — PANTOPRAZOLE SODIUM 40 MG/1
TABLET, DELAYED RELEASE ORAL
Qty: 30 TABLET | Refills: 4 | Status: SHIPPED | OUTPATIENT
Start: 2018-08-15

## 2019-01-22 DIAGNOSIS — I10 ESSENTIAL HYPERTENSION: ICD-10-CM

## 2019-01-23 RX ORDER — METOPROLOL TARTRATE 50 MG/1
TABLET, FILM COATED ORAL
Qty: 180 TABLET | Refills: 1 | Status: SHIPPED | OUTPATIENT
Start: 2019-01-23 | End: 2019-07-19 | Stop reason: SDUPTHER

## 2019-01-23 RX ORDER — AMLODIPINE BESYLATE 10 MG/1
TABLET ORAL
Qty: 90 TABLET | Refills: 1 | Status: SHIPPED | OUTPATIENT
Start: 2019-01-23 | End: 2019-07-19 | Stop reason: SDUPTHER

## 2019-03-08 ENCOUNTER — OFFICE VISIT (OUTPATIENT)
Dept: FAMILY MEDICINE CLINIC | Facility: CLINIC | Age: 84
End: 2019-03-08
Payer: MEDICARE

## 2019-03-08 VITALS
OXYGEN SATURATION: 92 % | DIASTOLIC BLOOD PRESSURE: 68 MMHG | TEMPERATURE: 97.4 F | HEIGHT: 61 IN | HEART RATE: 79 BPM | BODY MASS INDEX: 22.47 KG/M2 | SYSTOLIC BLOOD PRESSURE: 112 MMHG | RESPIRATION RATE: 16 BRPM | WEIGHT: 119 LBS

## 2019-03-08 DIAGNOSIS — J11.1 INFLUENZA: ICD-10-CM

## 2019-03-08 DIAGNOSIS — F41.9 ANXIETY: ICD-10-CM

## 2019-03-08 DIAGNOSIS — Z00.00 MEDICARE ANNUAL WELLNESS VISIT, SUBSEQUENT: Primary | ICD-10-CM

## 2019-03-08 DIAGNOSIS — B99.9 SUPERINFECTION: ICD-10-CM

## 2019-03-08 PROBLEM — I72.8 HEPATIC ARTERY ANEURYSM (HCC): Status: ACTIVE | Noted: 2019-03-08

## 2019-03-08 PROCEDURE — G0439 PPPS, SUBSEQ VISIT: HCPCS | Performed by: INTERNAL MEDICINE

## 2019-03-08 PROCEDURE — 99213 OFFICE O/P EST LOW 20 MIN: CPT | Performed by: INTERNAL MEDICINE

## 2019-03-08 RX ORDER — ALPRAZOLAM 0.25 MG/1
0.25 TABLET ORAL 3 TIMES DAILY PRN
Qty: 100 TABLET | Refills: 0 | Status: SHIPPED | OUTPATIENT
Start: 2019-03-08 | End: 2019-11-12 | Stop reason: SDUPTHER

## 2019-03-08 RX ORDER — OSELTAMIVIR PHOSPHATE 75 MG/1
75 CAPSULE ORAL EVERY 12 HOURS SCHEDULED
Qty: 10 CAPSULE | Refills: 0 | Status: SHIPPED | OUTPATIENT
Start: 2019-03-08 | End: 2019-03-13

## 2019-03-08 RX ORDER — LEVOFLOXACIN 500 MG/1
500 TABLET, FILM COATED ORAL EVERY 24 HOURS
Qty: 10 TABLET | Refills: 0 | Status: SHIPPED | OUTPATIENT
Start: 2019-03-08 | End: 2019-03-18

## 2019-03-08 NOTE — PROGRESS NOTES
Assessment and Plan:    Problem List Items Addressed This Visit     None        Health Maintenance Due   Topic Date Due    DTaP,Tdap,and Td Vaccines (1 - Tdap) 07/28/1953    INFLUENZA VACCINE  07/01/2018    Medicare Annual Wellness Visit (AWV)  02/09/2019    Fall Risk  02/28/2019    Depression Screening PHQ  02/28/2019    Urinary Incontinence Screening  02/28/2019         HPI:  Josey Suero is a 80 y o  female here for her Subsequent Wellness Visit      Patient Active Problem List   Diagnosis    Anticoagulant long-term use    Atrial fibrillation ([facility] )    Anxiety    Breast cancer ([facility] )    Cardiac pacemaker in situ    Chronic kidney disease, stage III (moderate) (HCC)    Essential hypertension    Hepatic metastasis (HCC)    Malignant neoplasm of colon ([facility] )    [facility]mental metastasis ([facility] )    Osteoporosis    Vitamin D deficiency    Macular degeneration    Legally blind     Past Medical History:   Diagnosis Date    Arm swelling     last assessed - 21Oct2016    Depression     Dysuria     last assessed - [id number]    Gastric ulcer     last assessed - [id number]    H/O: hysterectomy     Hip pain     last assessed - [id number]    Leg pain     last assessed - [id number]   [facility] Mass of left upper extremity     last assessed - 21Oct2016    Palpitations     last assessed - [id number]    Sick sinus syndrome [facility]) last assessed - [id number]    Sinus tachycardia     last assessed - [id number]    Vertigo     last assessed - [id number]     Past Surgical History:   Procedure Laterality Date    CARDIAC PACEMAKER PLACEMENT      CATARACT EXTRACTION      last assessed - [id number]    GALLBLADDER SURGERY      INFUSION PUMP IMPLANTATION      INTRA-ARTERIAL     MASTECTOMY      RADICAL ABDOMINAL HYSTERECTOMY      Total    WEDGE LIVER BIOPSY       Family History   Problem Relation Age of Onset    Breast cancer Mother     Depression Mother     Hypertension Father     Heart disease Father      Social History     Tobacco Use   Smoking Status Former Smoker    Packs/day: 1 00    Years: 56 00    Pack years: 56 00    Last attempt to quit: 2014    Years since quittin 1   Smokeless Tobacco Never Used   Tobacco Comment    <1ppd x 56 yrs quit  - smoked occasional thereafter she quit completely in       Social History     Substance and Sexual Activity   Alcohol Use No    Comment:        Social History     Substance and Sexual Activity   Drug Use No       Current Outpatient Medications   Medication Sig Dispense Refill    acetaminophen (TYLENOL) 325 mg tablet Take 650 mg by mouth every 4 (four) hours as needed for mild pain      ALPRAZolam (XANAX) 0 25 mg tablet Take 1 tablet (0 25 mg total) by mouth 3 (three) times a day as needed for anxiety 100 tablet 0    amLODIPine (NORVASC) 10 mg tablet TAKE 1 TABLET BY MOUTH EVERY DAY 90 tablet 1    anastrozole (ARIMIDEX) 1 mg tablet Take by mouth Daily      Cholecalciferol (D3 SUPER STRENGTH) 2000 units CAPS Take by mouth      metoprolol tartrate (LOPRESSOR) 50 mg tablet TAKE 1 TABLET BY MOUTH TWICE A  tablet 1    Multiple Vitamins-Minerals (CENTRUM ADULTS PO) Take 1 tablet by mouth daily      pantoprazole (PROTONIX) 40 mg tablet TAKE 1 TABLET ORALLY DAILY (DO NOT CRUSH) *SELF ADMINISTER* 30 tablet 4    rivaroxaban (XARELTO) 15 mg tablet Take by mouth      traMADol (ULTRAM) 50 mg tablet Take 1 tablet (50 mg total) by mouth every 6 (six) hours as needed for moderate pain 30 tablet 1    triamterene-hydrochlorothiazide (MAXZIDE-25) 37 5-25 mg per tablet Take 1 tablet by mouth daily 90 tablet 1    brimonidine tartrate 0 2 % ophthalmic solution INSTILL 1 DROP INTO RIGHT EYE TWICE A DAY  1     No current facility-administered medications for this visit        No Known Allergies  Immunization History   Administered Date(s) Administered    Influenza TIV (IM) 10/01/2015    Pneumococcal Conjugate 13-Valent 2017    Pneumococcal Polysaccharide PPV23 2010 Patient Care Team:  Yolanda Oates DO as PCP - General    Medicare Screening Tests and Risk Assessments:  Man Agent is here for her Subsequent Wellness visit  Health Risk Assessment:  Patient rates overall health as good  Patient feels that their physical health rating is Same  Eyesight was rated as Much worse  Hearing was rated as Slightly worse  Patient feels that their emotional and mental health rating is Same  Pain experienced by patient in the last 7 days has been None  Patient states that she has experienced no weight loss or gain in last 6 months  Emotional/Mental Health:  Patient has been feeling nervous/anxious  PHQ-9 Depression Screening:    Frequency of the following problems over the past two weeks:      1  Little interest or pleasure in doing things: 0 - not at all      2  Feeling down, depressed, or hopeless: 0 - not at all  PHQ-2 Score: 0          Broken Bones/Falls: Fall Risk Assessment:    In the past year, patient has experienced: No history of falling in past year          Bladder/Bowel:  Patient has leaked urine accidently in the last six months  Patient reports no loss of bowel control  Immunizations:  Patient has had a flu vaccination within the last year  Patient has received a pneumonia shot  Home Safety:  Patient has trouble with stairs inside or outside of their home  Patient currently reports that there are no safety hazards present in home, working smoke alarms,     Preventative Screenings:   glaucoma eye exam completed,     Nutrition:  Current diet: Regular and No Added Salt with servings of the following:    Medications:  Patient is currently taking over-the-counter supplements  Patient is able to manage medications  Lifestyle Choices:  Patient reports no tobacco use  Patient has smoked or used tobacco in the past   Patient has stopped her tobacco use  Patient reports no alcohol use  Patient does not drive a vehicle  Patient wears seat belt  Activities of Daily Living:  Can get out of bed by his or her self, unable to dress self, unable to make own meals, unable to do own shopping, unable to bathe self, unable to do laundry/housekeeping, unable to manage own money and other related tasks    Previous Hospitalizations:  No hospitalization or ED visit in past 12 months        Advanced Directives:  Patient has decided on a power of   Patient has spoken to designated power of   Patient has completed advanced directive  Preventative Screening/Counseling:      Cardiovascular:      General: Risks and Benefits Discussed and Screening Current          Diabetes:      General: Risks and Benefits Discussed and Screening Current          Colorectal Cancer:      General: Risks and Benefits Discussed and Screening Current          Breast Cancer:      General: Risks and Benefits Discussed and Patient Declines          Cervical Cancer:      General: Risks and Benefits Discussed and Patient Declines          Osteoporosis:      General: Risks and Benefits Discussed and Patient Declines          AAA:      General: Risks and Benefits Discussed and Patient Declines          Glaucoma:      General: Risks and Benefits Discussed and Screening Current          HIV:      General: Risks and Benefits Discussed and Patient Declines          Hepatitis C:      General: Risks and Benefits Discussed and Patient Declines        Advanced Directives:   Patient has living will for healthcare, has durable POA for healthcare, patient has an advanced directive  Immunizations:      Influenza: Risks & Benefits Discussed and Influenza UTD This Year      Pneumococcal: Risks & Benefits Discussed and Lifetime Vaccine Completed      Other Preventative Counseling (Non-Medicare):   Fall Prevention and Increase physical activity

## 2019-03-08 NOTE — PROGRESS NOTES
Assessment/Plan:         Diagnoses and all orders for this visit:    Medicare annual wellness visit, subsequent    Influenza  -     oseltamivir (TAMIFLU) 75 mg capsule; Take 1 capsule (75 mg total) by mouth every 12 (twelve) hours for 5 days    Superinfection  -     levofloxacin (LEVAQUIN) 500 mg tablet; Take 1 tablet (500 mg total) by mouth every 24 hours for 10 days    Anxiety  -     ALPRAZolam (XANAX) 0 25 mg tablet; Take 1 tablet (0 25 mg total) by mouth 3 (three) times a day as needed for anxiety          Subjective:      Patient ID: Margoth Graham is a 80 y o  female  Okay patient complains of a high fever on Wednesday almost up to 102 she also complained of headache and chills  Positive coughing feels her symptoms are mostly in her chest     Fever   Associated symptoms include chills, congestion, coughing, a fever and headaches  Pertinent negatives include no sore throat  The following portions of the patient's history were reviewed and updated as appropriate: She  has a past medical history of Arm swelling, Depression, Dysuria, Gastric ulcer, H/O: hysterectomy, Hip pain, Leg pain, Mass of left upper extremity, Palpitations, Sick sinus syndrome ([facility] ) (last assessed - [id number]), Sinus tachycardia, and Vertigo    She   Patient Active Problem List    Diagnosis Date Noted    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    Macular degeneration 02/09/2018    Legally blind 02/09/2018    Vitamin D deficiency 09/16/2017    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    Hepatic metastasis ([facility] ) 11/03/2016    Anticoagulant long-term use 12/30/2015    Cardiac pacemaker in situ 11/16/2015    Breast cancer ([facility] ) 01/13/2015    Atrial fibrillation ([facility] ) 07/22/2014    Osteoporosis 05/20/2014    Chronic kidney disease, stage III (moderate) ([facility] ) 05/15/2013    Essential hypertension 05/15/2013    Anxiety 03/19/2013    Malignant neoplasm of colon ([facility] ) 08/22/2012     She  has a past surgical history that includes Mastectomy; Cataract extraction; Gallbladder surgery; Infusion pump implantation; Cardiac pacemaker placement; Radical abdominal hysterectomy; and Wedge liver biopsy  Her family history includes Breast cancer in her mother; Depression in her mother; Heart disease in her father; Hypertension in her father  She  reports that she quit smoking about 5 years ago  She has a 56 00 pack-year smoking history  She has never used smokeless tobacco  She reports that she does not drink alcohol or use drugs  Current Outpatient Medications   Medication Sig Dispense Refill    acetaminophen (TYLENOL) 325 mg tablet Take 650 mg by mouth every 4 (four) hours as needed for mild pain      ALPRAZolam (XANAX) 0 25 mg tablet Take 1 tablet (0 25 mg total) by mouth 3 (three) times a day as needed for anxiety 100 tablet 0    amLODIPine (NORVASC) 10 mg tablet TAKE 1 TABLET BY MOUTH EVERY DAY 90 tablet 1    anastrozole (ARIMIDEX) 1 mg tablet Take by mouth Daily      Cholecalciferol (D3 SUPER STRENGTH) 2000 units CAPS Take by mouth      metoprolol tartrate (LOPRESSOR) 50 mg tablet TAKE 1 TABLET BY MOUTH TWICE A  tablet 1    Multiple Vitamins-Minerals (CENTRUM ADULTS PO) Take 1 tablet by mouth daily      pantoprazole (PROTONIX) 40 mg tablet TAKE 1 TABLET ORALLY DAILY (DO NOT CRUSH) *SELF ADMINISTER* 30 tablet 4    rivaroxaban (XARELTO) 15 mg tablet Take by mouth      traMADol (ULTRAM) 50 mg tablet Take 1 tablet (50 mg total) by mouth every 6 (six) hours as needed for moderate pain 30 tablet 1    triamterene-hydrochlorothiazide (MAXZIDE-25) 37 5-25 mg per tablet Take 1 tablet by mouth daily 90 tablet 1    levofloxacin (LEVAQUIN) 500 mg tablet Take 1 tablet (500 mg total) by mouth every 24 hours for 10 days 10 tablet 0    oseltamivir (TAMIFLU) 75 mg capsule Take 1 capsule (75 mg total) by mouth every 12 (twelve) hours for 5 days 10 capsule 0     No current facility-administered medications for this visit        Current Outpatient Medications on File Prior to Visit   Medication Sig    acetaminophen (TYLENOL) 325 mg tablet Take 650 mg by mouth every 4 (four) hours as needed for mild pain    amLODIPine (NORVASC) 10 mg tablet TAKE 1 TABLET BY MOUTH EVERY DAY    anastrozole (ARIMIDEX) 1 mg tablet Take by mouth Daily    Cholecalciferol (D3 SUPER STRENGTH) 2000 units CAPS Take by mouth    metoprolol tartrate (LOPRESSOR) 50 mg tablet TAKE 1 TABLET BY MOUTH TWICE A DAY    Multiple Vitamins-Minerals (CENTRUM ADULTS PO) Take 1 tablet by mouth daily    pantoprazole (PROTONIX) 40 mg tablet TAKE 1 TABLET ORALLY DAILY (DO NOT CRUSH) *SELF ADMINISTER*    rivaroxaban (XARELTO) 15 mg tablet Take by mouth    traMADol (ULTRAM) 50 mg tablet Take 1 tablet (50 mg total) by mouth every 6 (six) hours as needed for moderate pain    triamterene-hydrochlorothiazide (MAXZIDE-25) 37 5-25 mg per tablet Take 1 tablet by mouth daily     No current facility-administered medications on file prior to visit  She has No Known Allergies       Review of Systems   Constitutional: Positive for chills and fever  HENT: Positive for congestion  Negative for sore throat  Respiratory: Positive for cough and wheezing  Negative for shortness of breath  Cardiovascular: Negative  Neurological: Positive for headaches  Objective:      /68 (BP Location: Right arm, Patient Position: Sitting, Cuff Size: Standard)   Pulse 79   Temp (!) 97 4 °F (36 3 °C) (Tympanic)   Resp 16   Ht 5' 1" (1 549 m)   Wt 54 kg (119 lb)   SpO2 92%   BMI 22 48 kg/m²          Physical Exam   Constitutional: She appears well-developed and well-nourished  No distress  HENT:   Head: Normocephalic and atraumatic  Right Ear: External ear normal    Left Ear: External ear normal    Nose: Nose normal    Mouth/Throat: Oropharynx is clear and moist  No oropharyngeal exudate  Neck: Normal range of motion  Neck supple  No thyromegaly present     Cardiovascular: Normal rate, regular rhythm, normal heart sounds and intact distal pulses  Exam reveals no gallop and no friction rub  No murmur heard  Pulmonary/Chest: Effort normal  She has wheezes  She has no rales  Lymphadenopathy:     She has no cervical adenopathy  Skin: She is not diaphoretic

## 2019-04-12 DIAGNOSIS — I10 HYPERTENSION, UNSPECIFIED TYPE: ICD-10-CM

## 2019-04-12 RX ORDER — TRIAMTERENE AND HYDROCHLOROTHIAZIDE 37.5; 25 MG/1; MG/1
TABLET ORAL
Qty: 90 TABLET | Refills: 0 | Status: SHIPPED | OUTPATIENT
Start: 2019-04-12 | End: 2019-07-10 | Stop reason: SDUPTHER

## 2019-07-10 DIAGNOSIS — I10 HYPERTENSION, UNSPECIFIED TYPE: ICD-10-CM

## 2019-07-10 RX ORDER — TRIAMTERENE AND HYDROCHLOROTHIAZIDE 37.5; 25 MG/1; MG/1
TABLET ORAL
Qty: 90 TABLET | Refills: 0 | Status: SHIPPED | OUTPATIENT
Start: 2019-07-10 | End: 2019-07-22

## 2019-07-19 DIAGNOSIS — I10 ESSENTIAL HYPERTENSION: ICD-10-CM

## 2019-07-19 RX ORDER — AMLODIPINE BESYLATE 10 MG/1
TABLET ORAL
Qty: 90 TABLET | Refills: 1 | Status: SHIPPED | OUTPATIENT
Start: 2019-07-19 | End: 2019-11-12 | Stop reason: SDUPTHER

## 2019-07-19 RX ORDER — METOPROLOL TARTRATE 50 MG/1
TABLET, FILM COATED ORAL
Qty: 180 TABLET | Refills: 1 | Status: SHIPPED | OUTPATIENT
Start: 2019-07-19 | End: 2019-11-12 | Stop reason: SDUPTHER

## 2019-07-22 ENCOUNTER — OFFICE VISIT (OUTPATIENT)
Dept: FAMILY MEDICINE CLINIC | Facility: CLINIC | Age: 84
End: 2019-07-22
Payer: MEDICARE

## 2019-07-22 VITALS
BODY MASS INDEX: 23.22 KG/M2 | TEMPERATURE: 98.7 F | OXYGEN SATURATION: 94 % | RESPIRATION RATE: 16 BRPM | WEIGHT: 123 LBS | HEIGHT: 61 IN | HEART RATE: 108 BPM | SYSTOLIC BLOOD PRESSURE: 130 MMHG | DIASTOLIC BLOOD PRESSURE: 70 MMHG

## 2019-07-22 DIAGNOSIS — C18.9 MALIGNANT NEOPLASM OF COLON, UNSPECIFIED PART OF COLON (HCC): Primary | ICD-10-CM

## 2019-07-22 DIAGNOSIS — I49.5 SICK SINUS SYNDROME (HCC): ICD-10-CM

## 2019-07-22 DIAGNOSIS — Z95.0 CARDIAC PACEMAKER IN SITU: ICD-10-CM

## 2019-07-22 DIAGNOSIS — I48.91 ATRIAL FIBRILLATION, UNSPECIFIED TYPE (HCC): ICD-10-CM

## 2019-07-22 DIAGNOSIS — C78.7 HEPATIC METASTASIS (HCC): ICD-10-CM

## 2019-07-22 DIAGNOSIS — I10 ESSENTIAL HYPERTENSION: ICD-10-CM

## 2019-07-22 DIAGNOSIS — C50.919 MALIGNANT NEOPLASM OF FEMALE BREAST, UNSPECIFIED ESTROGEN RECEPTOR STATUS, UNSPECIFIED LATERALITY, UNSPECIFIED SITE OF BREAST (HCC): ICD-10-CM

## 2019-07-22 PROCEDURE — 99214 OFFICE O/P EST MOD 30 MIN: CPT | Performed by: INTERNAL MEDICINE

## 2019-07-23 PROBLEM — I49.5 SICK SINUS SYNDROME (HCC): Status: ACTIVE | Noted: 2019-07-23

## 2019-07-23 NOTE — PROGRESS NOTES
Assessment/Plan:         Diagnoses and all orders for this visit:    Malignant neoplasm of colon, unspecified part of colon ([facility] )  Comments:  follows with oncology    Sick sinus syndrome ([facility] )  Comments:  s/p pacer    Atrial fibrillation, unspecified type ([facility] )  Comments:  follows with oncology    Essential hypertension  Comments:  stable    Hepatic metastasis ([facility] )    Malignant neoplasm of female breast, unspecified estrogen receptor status, unspecified laterality, unspecified site of breast [facility])    Cardiac pacemaker in situ          Subjective:      Patient ID: Gina Becerra is a 80 y o  female  Pt has her yearly form to be done for [employer]      The following portions of the patient's history were reviewed and updated as appropriate: She  has a past medical history of Arm swelling, Depression, Dysuria, Gastric ulcer, H/O: hysterectomy, Hip pain, Leg pain, Mass of left upper extremity, Palpitations, Sick sinus syndrome ([facility] ) (last assessed - [id number]), Sinus tachycardia, and Vertigo  She   Patient Active Problem List    Diagnosis Date Noted    Sick sinus syndrome ([facility] ) 07/23/2019    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    Macular degeneration 02/09/2018    Legally blind 02/09/2018    Vitamin D deficiency 09/16/2017    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    Hepatic metastasis ([facility] ) 11/03/2016    Anticoagulant long-term use 12/30/2015    Cardiac pacemaker in situ 11/16/2015    Breast cancer ([facility] ) 01/13/2015    Atrial fibrillation ([facility] ) 07/22/2014    Osteoporosis 05/20/2014    Chronic kidney disease, stage III (moderate) [facility] ) 05/15/2013    Essential hypertension 05/15/2013    Anxiety 03/19/2013    Malignant neoplasm of colon ([facility] ) 08/22/2012     She  has a past surgical history that includes Mastectomy; Cataract extraction; Gallbladder surgery; Infusion pump implantation; Cardiac pacemaker placement; Radical abdominal hysterectomy; and Wedge liver biopsy    Her family history includes Breast cancer in her mother; Depression in her mother; Heart disease in her father; Hypertension in her father  She  reports that she quit smoking about 5 years ago  She has a 56 00 pack-year smoking history  She has never used smokeless tobacco  She reports that she does not drink alcohol or use drugs  Current Outpatient Medications   Medication Sig Dispense Refill    acetaminophen (TYLENOL) 325 mg tablet Take 650 mg by mouth every 4 (four) hours as needed for mild pain      ALPRAZolam (XANAX) 0 25 mg tablet Take 1 tablet (0 25 mg total) by mouth 3 (three) times a day as needed for anxiety 100 tablet 0    amLODIPine (NORVASC) 10 mg tablet TAKE 1 TABLET BY MOUTH EVERY DAY 90 tablet 1    anastrozole (ARIMIDEX) 1 mg tablet Take by mouth Daily      Cholecalciferol (D3 SUPER STRENGTH) 2000 units CAPS Take by mouth      metoprolol tartrate (LOPRESSOR) 50 mg tablet TAKE 1 TABLET BY MOUTH TWICE A  tablet 1    Multiple Vitamins-Minerals (CENTRUM ADULTS PO) Take 1 tablet by mouth daily      pantoprazole (PROTONIX) 40 mg tablet TAKE 1 TABLET ORALLY DAILY (DO NOT CRUSH) *SELF ADMINISTER* 30 tablet 4    rivaroxaban (XARELTO) 15 mg tablet Take by mouth      triamterene-hydrochlorothiazide (MAXZIDE-25) 37 5-25 mg per tablet Take 1 tablet by mouth daily 90 tablet 1     No current facility-administered medications for this visit        Current Outpatient Medications on File Prior to Visit   Medication Sig    acetaminophen (TYLENOL) 325 mg tablet Take 650 mg by mouth every 4 (four) hours as needed for mild pain    ALPRAZolam (XANAX) 0 25 mg tablet Take 1 tablet (0 25 mg total) by mouth 3 (three) times a day as needed for anxiety    amLODIPine (NORVASC) 10 mg tablet TAKE 1 TABLET BY MOUTH EVERY DAY    anastrozole (ARIMIDEX) 1 mg tablet Take by mouth Daily    Cholecalciferol (D3 SUPER STRENGTH) 2000 units CAPS Take by mouth    metoprolol tartrate (LOPRESSOR) 50 mg tablet TAKE 1 TABLET BY MOUTH TWICE A DAY    Multiple Vitamins-Minerals (CENTRUM ADULTS PO) Take 1 tablet by mouth daily    pantoprazole (PROTONIX) 40 mg tablet TAKE 1 TABLET ORALLY DAILY (DO NOT CRUSH) *SELF ADMINISTER*    rivaroxaban (XARELTO) 15 mg tablet Take by mouth    triamterene-hydrochlorothiazide (MAXZIDE-25) 37 5-25 mg per tablet Take 1 tablet by mouth daily     No current facility-administered medications on file prior to visit  She has No Known Allergies       Review of Systems   Constitutional: Negative  HENT: Negative  Respiratory: Negative  Cardiovascular: Negative  Objective:      /70 (BP Location: Right arm, Cuff Size: Standard)   Pulse (!) 108   Temp 98 7 °F (37 1 °C) (Tympanic)   Resp 16   Ht 5' 1" (1 549 m)   Wt 55 8 kg (123 lb)   SpO2 94%   BMI 23 24 kg/m²          Physical Exam   Constitutional: She appears well-developed and well-nourished  No distress  HENT:   Head: Normocephalic  Right Ear: External ear normal    Left Ear: External ear normal    Nose: Nose normal    Mouth/Throat: Oropharynx is clear and moist  No oropharyngeal exudate  Neck: Normal range of motion  Neck supple  No JVD present  No thyromegaly present  Cardiovascular: Normal rate, regular rhythm, normal heart sounds and intact distal pulses  Exam reveals no gallop and no friction rub  No murmur heard  Pulmonary/Chest: Effort normal and breath sounds normal  No respiratory distress  She has no wheezes  She has no rales  Lymphadenopathy:     She has no cervical adenopathy  Skin: She is not diaphoretic

## 2019-11-12 DIAGNOSIS — F41.9 ANXIETY: ICD-10-CM

## 2019-11-12 DIAGNOSIS — I10 HYPERTENSION, UNSPECIFIED TYPE: ICD-10-CM

## 2019-11-12 DIAGNOSIS — I10 ESSENTIAL HYPERTENSION: ICD-10-CM

## 2019-11-12 RX ORDER — AMLODIPINE BESYLATE 10 MG/1
10 TABLET ORAL DAILY
Qty: 90 TABLET | Refills: 1 | Status: SHIPPED | OUTPATIENT
Start: 2019-11-12

## 2019-11-12 RX ORDER — ALPRAZOLAM 0.25 MG/1
0.25 TABLET ORAL 3 TIMES DAILY PRN
Qty: 100 TABLET | Refills: 0 | Status: SHIPPED | OUTPATIENT
Start: 2019-11-12

## 2019-11-12 RX ORDER — METOPROLOL TARTRATE 50 MG/1
50 TABLET, FILM COATED ORAL 2 TIMES DAILY
Qty: 180 TABLET | Refills: 1 | Status: SHIPPED | OUTPATIENT
Start: 2019-11-12

## 2019-11-12 RX ORDER — TRIAMTERENE AND HYDROCHLOROTHIAZIDE 37.5; 25 MG/1; MG/1
1 TABLET ORAL DAILY
Qty: 90 TABLET | Refills: 1 | Status: SHIPPED | OUTPATIENT
Start: 2019-11-12

## 2020-03-05 ENCOUNTER — TELEPHONE (OUTPATIENT)
Dept: OTHER | Facility: OTHER | Age: 85
End: 2020-03-05

## 2020-03-06 NOTE — TELEPHONE ENCOUNTER
Manju Mary with [facility] called back because she did not received a call from on call provider      Paged pt info to [facility] via TT @ [phone]

## 2020-03-16 ENCOUNTER — TELEPHONE (OUTPATIENT)
Dept: FAMILY MEDICINE CLINIC | Facility: CLINIC | Age: 85
End: 2020-03-16

## 2020-03-16 NOTE — TELEPHONE ENCOUNTER
Melinda from [facility] called to make Dr Francheska Miller aware that pt passed away this AM